# Patient Record
Sex: FEMALE | Race: WHITE | NOT HISPANIC OR LATINO | Employment: OTHER | URBAN - METROPOLITAN AREA
[De-identification: names, ages, dates, MRNs, and addresses within clinical notes are randomized per-mention and may not be internally consistent; named-entity substitution may affect disease eponyms.]

---

## 2024-01-17 ENCOUNTER — APPOINTMENT (OUTPATIENT)
Dept: ORTHOPEDIC SURGERY | Facility: CLINIC | Age: 79
End: 2024-01-17
Payer: MEDICARE

## 2024-02-14 ENCOUNTER — APPOINTMENT (OUTPATIENT)
Dept: ORTHOPEDIC SURGERY | Facility: CLINIC | Age: 79
End: 2024-02-14
Payer: MEDICARE

## 2024-02-21 ENCOUNTER — OFFICE VISIT (OUTPATIENT)
Dept: ORTHOPEDIC SURGERY | Facility: CLINIC | Age: 79
End: 2024-02-21
Payer: MEDICARE

## 2024-02-21 DIAGNOSIS — M67.442 DIGITAL MUCOUS CYST OF FINGER OF LEFT HAND: Primary | ICD-10-CM

## 2024-02-21 PROCEDURE — 1160F RVW MEDS BY RX/DR IN RCRD: CPT | Performed by: ORTHOPAEDIC SURGERY

## 2024-02-21 PROCEDURE — 1036F TOBACCO NON-USER: CPT | Performed by: ORTHOPAEDIC SURGERY

## 2024-02-21 PROCEDURE — 1159F MED LIST DOCD IN RCRD: CPT | Performed by: ORTHOPAEDIC SURGERY

## 2024-02-21 PROCEDURE — 99214 OFFICE O/P EST MOD 30 MIN: CPT | Performed by: ORTHOPAEDIC SURGERY

## 2024-02-21 PROCEDURE — 1125F AMNT PAIN NOTED PAIN PRSNT: CPT | Performed by: ORTHOPAEDIC SURGERY

## 2024-02-21 NOTE — LETTER
March 9, 2024     Eulalio Higuera MD  [address]  [place]  [facility] [address]    Patient: Tabatha Schroeder   YOB: 1945   Date of Visit: 2/21/2024       Dear Dr. Eulalio Higuera MD:    Thank you for referring Tabatha Schroeder to me for evaluation. Below are my notes for this consultation.  If you have questions, please do not hesitate to call me. I look forward to following your patient along with you.       Sincerely,     Jaime Fischer MD      CC: No Recipients  ______________________________________________________________________________________    CHIEF COMPLAINT         Left long cyst    ASSESSMENT + PLAN    Left long finger ulnar mucous cyst    I reviewed the benign nature of the cyst and the relation to the small underlying bone spur from osteoarthritis.  I discussed that simple aspiration leads to inevitable rapid recurrence.  I reviewed the option of surgical excision of the cyst and the spur to minimize the odds of recurrence, though it is still 10%.  I discussed the major risks and benefits of the surgery.    She does want to go ahead with surgery.  This will be done under straight local.  I reviewed that, given her [facility] heritage, it may be difficult to get her adequately comfortable with local alone.  She does want to try anyway.  She will contact the office to set up an exact surgical date.      HISTORY OF PRESENT ILLNESS       Patient returns today, 16 months after last visit with me with a new concern.  Her right long trigger finger has been doing well.  She is here with a mass over her left long finger ulnar DIP dorsum.  This has been present for many months and is slowly enlarging.  It has not drained.  No popping or clicking of the underlying joint.  No similar masses elsewhere.  At this point she wants to proceed with surgery as the mass is bothersome when bumped.      PHYSICAL EXAM       She remains well-developed, well-nourished female in no  acute distress.  She appears her stated age and has a pleasant affect.  Skin of left long finger and hand remains intact with no erythema, ecchymosis, or diffuse swelling.  There is a small mass consistent with a mucous cyst with a palpable underlying bone spur.  No fingernail change.  DIP joint stable to varus and valgus stress.  Good composite finger flexion extension.  Good sagittal plane balance.  Tendons are intact by individual testing.  Sensation intact to light touch in all distributions.  Capillary refill less than 2 seconds.      IMAGING / LABS / EMGs        None today    SURGICAL INDICATION    I reviewed the options for further management of this condition and the likely success rates of each.  The patient feels that they have maximized the benefits of conservative care, and they do want to go on to surgery.    I reviewed the major risks of surgery including infection; scarring; damage to nerves, tendons, fingernail, or vessels; stiffness; failure to relieve mass, recurrent mass, and wound healing problems, as well as anesthesia risks.  I answered their questions to their satisfaction.  They were given my contact information and will contact the office when they are ready to schedule an exact surgical date.  Surgery will be posted as follows :    Dx :          Left long mucous cyst  ICD-10 :      M67.442  Procedure :     Excision of left long ulnar mucous cyst and bone spur  CPT :        51544  Anesth :    Local only  Location :   [facility]  Duration :    45 minutes  Specials :    None  PAT :       No  Post-Op Visit :    10-15 days        Electronically Signed      ERNESTINE Fischer MD      Orthopaedic Hand Surgery      [phone]

## 2024-03-09 NOTE — PROGRESS NOTES
CHIEF COMPLAINT         Left long cyst    ASSESSMENT + PLAN    Left long finger ulnar mucous cyst    I reviewed the benign nature of the cyst and the relation to the small underlying bone spur from osteoarthritis.  I discussed that simple aspiration leads to inevitable rapid recurrence.  I reviewed the option of surgical excision of the cyst and the spur to minimize the odds of recurrence, though it is still 10%.  I discussed the major risks and benefits of the surgery.    She does want to go ahead with surgery.  This will be done under straight local.  I reviewed that, given her [place] heritage, it may be difficult to get her adequately comfortable with local alone.  She does want to try anyway.  She will contact the office to set up an exact surgical date.      HISTORY OF PRESENT ILLNESS       Patient returns today, 16 months after last visit with me with a new concern.  Her right long trigger finger has been doing well.  She is here with a mass over her left long finger ulnar DIP dorsum.  This has been present for many months and is slowly enlarging.  It has not drained.  No popping or clicking of the underlying joint.  No similar masses elsewhere.  At this point she wants to proceed with surgery as the mass is bothersome when bumped.      PHYSICAL EXAM       She remains well-developed, well-nourished female in no acute distress.  She appears her stated age and has a pleasant affect.  Skin of left long finger and hand remains intact with no erythema, ecchymosis, or diffuse swelling.  There is a small mass consistent with a mucous cyst with a palpable underlying bone spur.  No fingernail change.  DIP joint stable to varus and valgus stress.  Good composite finger flexion extension.  Good sagittal plane balance.  Tendons are intact by individual testing.  Sensation intact to light touch in all distributions.  Capillary refill less than 2 seconds.      IMAGING / LABS / EMGs        None today    SURGICAL  INDICATION    I reviewed the options for further management of this condition and the likely success rates of each.  The patient feels that they have maximized the benefits of conservative care, and they do want to go on to surgery.    I reviewed the major risks of surgery including infection; scarring; damage to nerves, tendons, fingernail, or vessels; stiffness; failure to relieve mass, recurrent mass, and wound healing problems, as well as anesthesia risks.  I answered their questions to their satisfaction.  They were given my contact information and will contact the office when they are ready to schedule an exact surgical date.  Surgery will be posted as follows :    Dx :          Left long mucous cyst  ICD-10 :      M67.442  Procedure :     Excision of left long ulnar mucous cyst and bone spur  CPT :        01730  Anesth :    Local only  Location :   [facility]  Duration :    45 minutes  Specials :    None  PAT :       No  Post-Op Visit :    10-15 days        Electronically Signed      ERNESTINE Fischer MD      Orthopaedic Hand Surgery      [phone]

## 2024-06-05 ENCOUNTER — APPOINTMENT (OUTPATIENT)
Dept: ORTHOPEDIC SURGERY | Facility: CLINIC | Age: 79
End: 2024-06-05
Payer: MEDICARE

## 2024-06-12 ENCOUNTER — APPOINTMENT (OUTPATIENT)
Dept: ORTHOPEDIC SURGERY | Facility: CLINIC | Age: 79
End: 2024-06-12
Payer: MEDICARE

## 2024-06-19 ENCOUNTER — APPOINTMENT (OUTPATIENT)
Dept: ORTHOPEDIC SURGERY | Facility: CLINIC | Age: 79
End: 2024-06-19
Payer: MEDICARE

## 2024-07-03 ENCOUNTER — OFFICE VISIT (OUTPATIENT)
Dept: ORTHOPEDIC SURGERY | Facility: CLINIC | Age: 79
End: 2024-07-03
Payer: MEDICARE

## 2024-07-03 DIAGNOSIS — M65.332 TRIGGER FINGER, LEFT MIDDLE FINGER: ICD-10-CM

## 2024-07-03 DIAGNOSIS — M65.331 TRIGGER MIDDLE FINGER OF RIGHT HAND: Primary | ICD-10-CM

## 2024-07-03 PROCEDURE — 99213 OFFICE O/P EST LOW 20 MIN: CPT | Performed by: ORTHOPAEDIC SURGERY

## 2024-07-03 PROCEDURE — 20550 NJX 1 TENDON SHEATH/LIGAMENT: CPT | Mod: LT | Performed by: ORTHOPAEDIC SURGERY

## 2024-07-03 PROCEDURE — 2500000005 HC RX 250 GENERAL PHARMACY W/O HCPCS: Performed by: ORTHOPAEDIC SURGERY

## 2024-07-03 PROCEDURE — 2500000004 HC RX 250 GENERAL PHARMACY W/ HCPCS (ALT 636 FOR OP/ED): Performed by: ORTHOPAEDIC SURGERY

## 2024-07-03 PROCEDURE — 20550 NJX 1 TENDON SHEATH/LIGAMENT: CPT | Mod: RT | Performed by: ORTHOPAEDIC SURGERY

## 2024-07-03 NOTE — LETTER
July 4, 2024     Eulalio Higuera MD  [facility] [address]    Patient: Tabatha Schroeder   YOB: 1945   Date of Visit: 7/3/2024       Dear Dr. Eulalio Higuera MD:    Thank you for referring Tabatha Schroeder to me for evaluation. Below are my notes for this consultation.  If you have questions, please do not hesitate to call me. I look forward to following your patient along with you.       Sincerely,     Jaime Fischer MD      CC: No Recipients  ______________________________________________________________________________________    CHIEF COMPLAINT         Bilateral trigger finger    ASSESSMENT + PLAN    Bilateral long trigger fingers    The nature of trigger finger was reviewed, along with the natural history.  I reviewed the options for management, including observation, nonsteroidals, splinting, oral steroids, cortisone injection, or surgical release, along with the likely success rates of each.  I reviewed the risks of cortisone injection, including infection, hypopigmentation, and fat atrophy.  I cautioned the patient to avoid hot objects where the finger could be burned, if it becomes insensate temporarily from the lidocaine. The transient cortisone effect on blood glucose measurement was also reviewed, and the patient wished to proceed.    After sterile prep, I injected 20 mg of Kenalog and 0.5 cc of 1% lidocaine, plain to the flexor sheath of the each long finger.    Patient will followup in 4 weeks if still symptomatic, or with any concerns.        HISTORY OF PRESENT ILLNESS       Patient returns today, 5 months after last visit with me.  Her left long ulnar mucous cyst has been deflating, leaving just the bone spur visible.  She is pleased with this.  She has developed popping and clicking of both long fingers again.  She had a good response to the previous cortisone injections October 19, 2022.  Symptoms have been back for just a few weeks.  No recalled interval  trauma.  No other bothersome digits.  She is requesting bilateral cortisone injections for these today.      PHYSICAL EXAM       She remains well-developed, well-nourished female in no acute distress.  She appears her stated age and has a pleasant affect.  Skin of both hands and wrists remains intact with no erythema, ecchymosis, or diffuse swelling.  Normal skin drag and coloration.  Full composite finger flexion extension with full intrinsic plus minus posture.  Symmetric wrist and forearm motion.  Palpable flexor nodule and A1 tenderness of both long fingers.  Obvious spontaneous triggering.  Good sagittal plane balance.  Tendons are intact by individual testing.  Negative Hoffmann.  Negative midcarpal shift.  Sensation intact to light touch in all distributions.  Capillary refill less than 2 seconds.    IMAGING / LABS / EMGs        None today      PROCEDURE    Hand / UE Inj/Asp: R long A1 for trigger finger on 7/3/2024 9:25 AM  Indications: therapeutic  Details: 25 G needle, volar approach  Medications: 20 mg triamcinolone acetonide 40 mg/mL; 0.5 mL lidocaine 10 mg/mL (1 %)  Outcome: tolerated well, no immediate complications  Procedure, treatment alternatives, risks and benefits explained, specific risks discussed. Consent was given by the patient.       Hand / UE Inj/Asp: L long A1 for trigger finger on 7/3/2024 9:25 AM  Indications: therapeutic  Details: 25 G needle, volar approach  Medications: 20 mg triamcinolone acetonide 40 mg/mL; 0.5 mL lidocaine 10 mg/mL (1 %)  Outcome: tolerated well, no immediate complications  Procedure, treatment alternatives, risks and benefits explained, specific risks discussed. Consent was given by the patient.             Electronically Signed      ERNESTINE Fischer MD      Orthopaedic Hand Surgery      [phone]

## 2024-07-04 PROBLEM — M65.332 TRIGGER FINGER, LEFT MIDDLE FINGER: Status: ACTIVE | Noted: 2024-07-04

## 2024-07-04 PROBLEM — M65.331 TRIGGER MIDDLE FINGER OF RIGHT HAND: Status: ACTIVE | Noted: 2024-07-04

## 2024-07-04 RX ORDER — TRIAMCINOLONE ACETONIDE 40 MG/ML
20 INJECTION, SUSPENSION INTRA-ARTICULAR; INTRAMUSCULAR
Status: COMPLETED | OUTPATIENT
Start: 2024-07-03 | End: 2024-07-03

## 2024-07-04 RX ORDER — LIDOCAINE HYDROCHLORIDE 10 MG/ML
0.5 INJECTION INFILTRATION; PERINEURAL
Status: COMPLETED | OUTPATIENT
Start: 2024-07-03 | End: 2024-07-03

## 2024-07-04 NOTE — PROGRESS NOTES
CHIEF COMPLAINT         Bilateral trigger finger    ASSESSMENT + PLAN    Bilateral long trigger fingers    The nature of trigger finger was reviewed, along with the natural history.  I reviewed the options for management, including observation, nonsteroidals, splinting, oral steroids, cortisone injection, or surgical release, along with the likely success rates of each.  I reviewed the risks of cortisone injection, including infection, hypopigmentation, and fat atrophy.  I cautioned the patient to avoid hot objects where the finger could be burned, if it becomes insensate temporarily from the lidocaine. The transient cortisone effect on blood glucose measurement was also reviewed, and the patient wished to proceed.    After sterile prep, I injected 20 mg of Kenalog and 0.5 cc of 1% lidocaine, plain to the flexor sheath of the each long finger.    Patient will followup in 4 weeks if still symptomatic, or with any concerns.        HISTORY OF PRESENT ILLNESS       Patient returns today, 5 months after last visit with me.  Her left long ulnar mucous cyst has been deflating, leaving just the bone spur visible.  She is pleased with this.  She has developed popping and clicking of both long fingers again.  She had a good response to the previous cortisone injections October 19, 2022.  Symptoms have been back for just a few weeks.  No recalled interval trauma.  No other bothersome digits.  She is requesting bilateral cortisone injections for these today.      PHYSICAL EXAM       She remains well-developed, well-nourished female in no acute distress.  She appears her stated age and has a pleasant affect.  Skin of both hands and wrists remains intact with no erythema, ecchymosis, or diffuse swelling.  Normal skin drag and coloration.  Full composite finger flexion extension with full intrinsic plus minus posture.  Symmetric wrist and forearm motion.  Palpable flexor nodule and A1 tenderness of both long fingers.  Obvious  spontaneous triggering.  Good sagittal plane balance.  Tendons are intact by individual testing.  Negative Hoffmann.  Negative midcarpal shift.  Sensation intact to light touch in all distributions.  Capillary refill less than 2 seconds.    IMAGING / LABS / EMGs        None today      PROCEDURE    Hand / UE Inj/Asp: R long A1 for trigger finger on 7/3/2024 9:25 AM  Indications: therapeutic  Details: 25 G needle, volar approach  Medications: 20 mg triamcinolone acetonide 40 mg/mL; 0.5 mL lidocaine 10 mg/mL (1 %)  Outcome: tolerated well, no immediate complications  Procedure, treatment alternatives, risks and benefits explained, specific risks discussed. Consent was given by the patient.       Hand / UE Inj/Asp: L long A1 for trigger finger on 7/3/2024 9:25 AM  Indications: therapeutic  Details: 25 G needle, volar approach  Medications: 20 mg triamcinolone acetonide 40 mg/mL; 0.5 mL lidocaine 10 mg/mL (1 %)  Outcome: tolerated well, no immediate complications  Procedure, treatment alternatives, risks and benefits explained, specific risks discussed. Consent was given by the patient.             Electronically Signed      ERNESTINE Fischer MD      Orthopaedic Hand Surgery      [phone]

## 2024-07-25 DIAGNOSIS — M65.332 TRIGGER FINGER, LEFT MIDDLE FINGER: ICD-10-CM

## 2024-07-25 DIAGNOSIS — M65.331 TRIGGER MIDDLE FINGER OF RIGHT HAND: ICD-10-CM

## 2024-08-23 DIAGNOSIS — M65.332 TRIGGER FINGER, LEFT MIDDLE FINGER: ICD-10-CM

## 2024-08-23 DIAGNOSIS — M65.331 TRIGGER MIDDLE FINGER OF RIGHT HAND: ICD-10-CM

## 2024-10-16 ENCOUNTER — APPOINTMENT (OUTPATIENT)
Dept: ORTHOPEDIC SURGERY | Facility: CLINIC | Age: 79
End: 2024-10-16
Payer: MEDICARE

## 2024-10-16 DIAGNOSIS — M79.89 PAIN AND SWELLING OF FOREARM, LEFT: Primary | ICD-10-CM

## 2024-10-16 DIAGNOSIS — M79.632 PAIN AND SWELLING OF FOREARM, LEFT: Primary | ICD-10-CM

## 2024-10-16 PROCEDURE — 99213 OFFICE O/P EST LOW 20 MIN: CPT | Performed by: ORTHOPAEDIC SURGERY

## 2024-10-16 PROCEDURE — 1159F MED LIST DOCD IN RCRD: CPT | Performed by: ORTHOPAEDIC SURGERY

## 2024-10-16 PROCEDURE — 1160F RVW MEDS BY RX/DR IN RCRD: CPT | Performed by: ORTHOPAEDIC SURGERY

## 2024-10-16 PROCEDURE — 1036F TOBACCO NON-USER: CPT | Performed by: ORTHOPAEDIC SURGERY

## 2025-01-08 ENCOUNTER — APPOINTMENT (OUTPATIENT)
Dept: RHEUMATOLOGY | Facility: CLINIC | Age: 80
End: 2025-01-08
Payer: MEDICARE

## 2025-01-08 VITALS
TEMPERATURE: 96.4 F | HEIGHT: 60 IN | DIASTOLIC BLOOD PRESSURE: 58 MMHG | HEART RATE: 74 BPM | WEIGHT: 135 LBS | SYSTOLIC BLOOD PRESSURE: 139 MMHG | BODY MASS INDEX: 26.5 KG/M2

## 2025-01-08 DIAGNOSIS — M47.816 SPONDYLOSIS OF LUMBAR REGION WITHOUT MYELOPATHY OR RADICULOPATHY: ICD-10-CM

## 2025-01-08 DIAGNOSIS — M70.61 TROCHANTERIC BURSITIS OF RIGHT HIP: Primary | ICD-10-CM

## 2025-01-08 PROCEDURE — 1036F TOBACCO NON-USER: CPT | Performed by: INTERNAL MEDICINE

## 2025-01-08 PROCEDURE — 1160F RVW MEDS BY RX/DR IN RCRD: CPT | Performed by: INTERNAL MEDICINE

## 2025-01-08 PROCEDURE — 1159F MED LIST DOCD IN RCRD: CPT | Performed by: INTERNAL MEDICINE

## 2025-01-08 PROCEDURE — 99214 OFFICE O/P EST MOD 30 MIN: CPT | Performed by: INTERNAL MEDICINE

## 2025-01-08 PROCEDURE — 1125F AMNT PAIN NOTED PAIN PRSNT: CPT | Performed by: INTERNAL MEDICINE

## 2025-01-08 RX ORDER — GLUCOSAMINE/CHONDR SU A SOD 750-600 MG
TABLET ORAL
COMMUNITY

## 2025-01-08 RX ORDER — DOCUSATE SODIUM 100 MG/1
100 CAPSULE, LIQUID FILLED ORAL 3 TIMES DAILY PRN
COMMUNITY

## 2025-01-08 RX ORDER — CLONAZEPAM 0.5 MG/1
0.5 TABLET ORAL AS NEEDED
COMMUNITY

## 2025-01-08 RX ORDER — HYDROCHLOROTHIAZIDE 25 MG/1
25 TABLET ORAL DAILY
COMMUNITY

## 2025-01-08 RX ORDER — SITAGLIPTIN AND METFORMIN HYDROCHLORIDE 1000; 50 MG/1; MG/1
1 TABLET, FILM COATED ORAL
COMMUNITY

## 2025-01-08 RX ORDER — ACARBOSE 25 MG/1
25 TABLET ORAL
COMMUNITY

## 2025-01-08 RX ORDER — ASPIRIN 81 MG/1
81 TABLET ORAL DAILY
COMMUNITY

## 2025-01-08 RX ORDER — MAGNESIUM 250 MG
250 TABLET ORAL 2 TIMES DAILY
COMMUNITY

## 2025-01-08 RX ORDER — ACETAMINOPHEN 500 MG
2000 TABLET ORAL DAILY
COMMUNITY

## 2025-01-08 RX ORDER — LIOTHYRONINE SODIUM 5 UG/1
5 TABLET ORAL DAILY
COMMUNITY

## 2025-01-08 RX ORDER — SIMVASTATIN 40 MG/1
40 TABLET, FILM COATED ORAL DAILY
COMMUNITY

## 2025-01-08 RX ORDER — ALLOPURINOL 100 MG/1
100 TABLET ORAL DAILY
COMMUNITY

## 2025-01-08 ASSESSMENT — PAIN SCALES - GENERAL: PAINLEVEL_OUTOF10: 4

## 2025-01-10 NOTE — PROGRESS NOTES
She is a 79-year-old female with history of rheumatoid arthritis, Sjogren syndrome, seizure disorder status post gamma knife resection of benign brain tumor, type 2 diabetes mellitus, lipodystrophicus.  She was last seen in the rheumatology department 3/15/2023.  She presents with complaints of pain in the right lower back and the lateral aspect of the right hip.  She has had improvement in the lower back pain following local glucocorticosteroid injections in the past.  She is planning to wean off of the seizure medications because she has been seizure-free for the past 2 years.  She is generally only had focal seizure but tremor in the right hand.  She has multiple subcutaneous nodules particular on the forearm abdomen and lower extremities due to lipodystrophy.  She continues to be able to ambulate and remains active.  PH: Glutaraldehyde (shortness of breath), [place] (anaphylaxis, doxycycline (rash), hexachlorophene, iodine, clindamycin, codeine (anaphylaxis), atorvastatin, cetirizine, gemfibrozil, opioids diatrizoate meglumine (anaphylaxis); illnesses: Focal seizure status post resection of meningioma from the falx, rheumatoid arthritis, Sjogren syndrome, carpal tunnel syndrome, right cubital tunnel syndrome, type 2 diabetes mellitus, hypertension, hypothyroidism, chronic lower back pain, sacroiliac joint instability, recurrent right greater trochanteric bursitis, meningioma involving the falx resected with gamma knife complicated by cerebral edema and focal seizures in the right upper extremity, gout, multiple lipoid subcutaneous nodules, lipodystrophy; surgeries: Resection of a mucoid cyst from the fifth digit of the right hand, right rotator cuff repair, total abdominal hysterectomy with bilateral salpingo-oophorectomy, gamma knife resection of benign meningioma from the falx (2018), resection of a benign right ethmoid tumor.  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  She  does not use tobacco alcohol or illicit drugs.  FH: Her father is  and had hypertension diabetes mellitus and heart disease.  Her mother had multiple myeloma and hypertension.  She has a daughter with hypertension.  She has an uncle who  at 38 years of age with myocardial infarction.  She has no siblings and no sons.  PX: She is a well-developed, well-nourished, overweight white female with palpable subcutaneous nodules on the proximal aspect of the left forearm.  She is able to ambulate without assistance.  There is tenderness in the right lower back and posterior superior aspect of the right greater trochanter.  There is thickening of the tendons on the palm of both hands with subcutaneous nodules.  There is preserved extension of the digits of both hands.  There is thenar and hypothenar muscle atrophy of the right hand.  There is good passive range of motion of the wrist elbows shoulders hips and knees.  Laboratory (10/11/2024) WBC 7.25, hemoglobin 13.5, hematocrit 40.6, MCV 91.0, MCHC 33.3, platelets 187, BUN 16, creatinine 0.59, glucose 132, alkaline phosphatase 26, AST 20, ALT 18, calcium 10.0, albumin 4.5.  Ultrasound of the left upper extremity (10/11/2024) no evidence of deep vein thrombosis.  Impression: Hypoalkaline phosphatase GERD, right renal calculus, small hiatus hernia, rheumatoid arthritis, Sjogren syndrome, type 2 diabetes mellitus, Dupuytren's involvement of the hands, hypothyroidism, chronic lower back pain, right greater trochanteric bursitis, focal seizures in the right upper extremity status post resection of meningioma from the falx, status post transsphenoidal resection of benign brain tumor.  Plan: She was given a local injection of triamcinolone 20 mg with 1 mL of 1% lidocaine to the region of tenderness in the right lower back and to the tender area of the right greater trochanteric bursa.  She is to return at the next available office appointment.

## 2025-01-15 ENCOUNTER — TELEPHONE (OUTPATIENT)
Dept: ORTHOPEDIC SURGERY | Facility: HOSPITAL | Age: 80
End: 2025-01-15
Payer: MEDICARE

## 2025-01-15 NOTE — TELEPHONE ENCOUNTER
Copied from CRM #[id number]. Topic: Information Request - Get Appointment Information  >> Jan 14, 2025  4:27 PM Romina MONTGOMERY wrote:  Patient is looking to get a next day appointment in [place] after a fall. She has pain in both wrists. Patient did not want to schedule, she would like to speak to the office. Please call to assist.

## 2025-01-22 ENCOUNTER — TELEPHONE (OUTPATIENT)
Dept: RHEUMATOLOGY | Facility: CLINIC | Age: 80
End: 2025-01-22
Payer: MEDICARE

## 2025-01-22 NOTE — TELEPHONE ENCOUNTER
Pt would like to speak about progress notes as she says the information included is incorrect: meds, seizures, and extremities. Pt says other doctors are concerned about terminology. Please advise.    Ph:[phone]- call after 10am

## 2025-01-22 NOTE — TELEPHONE ENCOUNTER
"Placed call to and spoke with patient. Patient voiced concerned over some of the terminology used in the progress note written on 1/8/2025 by Dr. Elmore. Patient states that she is still on her seizure medications and the medication is not being weaned. She states that she has not been seizure free for 2 years. She states that she has had \"focal seizures limited to right hand\" and not her whole right upper extremity. She states that she did not have a resection of meningioma, it was performed by gamma knife. She states the resection of the \"benign mass\" was not performed transsphenoidal, it was done \"transnasal.\" Patient states that she is concerned if one of her other providers read the note they will think that her condition has changed and that she is \"having seizures up to my shoulder\" and not keeping the providers updated on her condition. Patient made aware that Dr. Elmore will be notified of her concerns. Patient verbalized understanding. Patient encouraged to contact the office with any other questions or concerns.   "

## 2025-02-03 ENCOUNTER — OFFICE VISIT (OUTPATIENT)
Dept: ORTHOPEDIC SURGERY | Facility: CLINIC | Age: 80
End: 2025-02-03
Payer: MEDICARE

## 2025-02-03 ENCOUNTER — HOSPITAL ENCOUNTER (OUTPATIENT)
Dept: RADIOLOGY | Facility: CLINIC | Age: 80
Discharge: HOME | End: 2025-02-03
Payer: MEDICARE

## 2025-02-03 VITALS — WEIGHT: 135 LBS | BODY MASS INDEX: 26.5 KG/M2 | HEIGHT: 60 IN

## 2025-02-03 DIAGNOSIS — M25.532 BILATERAL WRIST PAIN: ICD-10-CM

## 2025-02-03 DIAGNOSIS — M79.642 BILATERAL HAND PAIN: ICD-10-CM

## 2025-02-03 DIAGNOSIS — S60.229A CONTUSION OF HAND, INITIAL ENCOUNTER: ICD-10-CM

## 2025-02-03 DIAGNOSIS — M79.641 BILATERAL HAND PAIN: ICD-10-CM

## 2025-02-03 DIAGNOSIS — M25.531 BILATERAL WRIST PAIN: ICD-10-CM

## 2025-02-03 PROCEDURE — 1160F RVW MEDS BY RX/DR IN RCRD: CPT

## 2025-02-03 PROCEDURE — 1159F MED LIST DOCD IN RCRD: CPT

## 2025-02-03 PROCEDURE — 1036F TOBACCO NON-USER: CPT

## 2025-02-03 PROCEDURE — 73110 X-RAY EXAM OF WRIST: CPT | Mod: BILATERAL PROCEDURE | Performed by: RADIOLOGY

## 2025-02-03 PROCEDURE — 99203 OFFICE O/P NEW LOW 30 MIN: CPT

## 2025-02-03 PROCEDURE — 73130 X-RAY EXAM OF HAND: CPT | Mod: 50

## 2025-02-03 PROCEDURE — 73110 X-RAY EXAM OF WRIST: CPT | Mod: 50

## 2025-02-03 PROCEDURE — 73130 X-RAY EXAM OF HAND: CPT | Mod: BILATERAL PROCEDURE | Performed by: RADIOLOGY

## 2025-02-03 PROCEDURE — 99213 OFFICE O/P EST LOW 20 MIN: CPT

## 2025-02-03 NOTE — PROGRESS NOTES
Subjective    Patient ID: Tabatha Schroeder is a 79 y.o. female.    Chief Complaint: Pain of the Right Hand (Tripped over a boot at home striking the wall with both hands), Pain of the Left Hand, Pain of the Right Wrist, and Pain of the Left Wrist    HPI  This is a pleasant ambidextrous 79-year-old female presenting to the same-day injury clinic for evaluation of bilateral hand and wrist pain, which has been ongoing since January 14, 2025.  Patient states that she tripped over a boot in her home, and caught herself with bilateral outstretched hands and wrist.  She immediately experienced increased pain in both hands and wrist, but over the last few weeks, pain has not subsided.  States that pain is worse to the right hand and wrist.  She points to the ulnar aspect of right hand, right fourth and fifth digits when describing the pain.  She has not noticed any significant swelling or bruising.  Patient does have a significant history in regards to bilateral hands, and sees Dr. Jaime Fischer.  Patient states that he was too difficult to get into, therefore she presided to the injury clinic.  Patient has a significant history in regards to both hands, stating history of carpal tunnel, Dupuytren's, de Quervain's, rheumatoid arthritis.  Patient states at baseline she has decreased  strength bilaterally.  She also has some degree of pain and neuropathy in both hands at baseline.  Presents to the office today inquiring whether or not she has broken a bone.    The patient's past medical, surgical, family, and social history as well as allergies and medications were reviewed and updated in the chart.    Objective   Ortho Exam  Pleasant in no acute distress.  Bilateral hands and wrist appearing without soft tissue swelling erythema or ecchymosis.  There is no significant warmth upon touch and skin is intact.  She is diffusely tender upon right distal radius, as well as proximal phalanx of right fourth and fifth digits.   There are no specific point tenderness areas to left hand and wrist.  Exam is difficult as patient has some degree of pain and inflammation at baseline in both hands.  Keeping that in mind, she has slightly limited range of motion of both right and left wrist in terms of flexion and extension.  There is decreased  strength of the right and left hand.  She is able to fully flex and extend all right and left hand digits.  Sensation is intact to light touch.    Image Results:  Multiple view x-rays of the right hand and wrist as well as left hand and wrist obtained today personally reviewed.  There is no evidence of acute fracture or dislocation.  Degenerative changes noted throughout each hand.  No acute bony abnormality noted.    Assessment/Plan   Encounter Diagnoses:  Bilateral hand pain    Bilateral wrist pain    Contusion of hand, initial encounter    Plan: Discussion with patient in regards to contusion of bilateral hands and wrist, which is most likely increased pain in regards to pre-existing bilateral hand and wrist conditions.  Explained to patient that these type of injuries can take anywhere from 4 to 6 weeks to fully heal.  Advised patient that she can continue with anti-inflammatories and avoiding aggravating activities.  She can apply heat or ice.  Patient states that she has a multitude of hand and wrist braces at home that she can use, specifically for the right hand.  I have advised that patient continue with her follow-up that she has scheduled with Dr. Fischer on February 7, 2025.  She can follow-up as needed.    Orders Placed This Encounter    XR hand 3+ views bilateral    XR wrist 3+ views bilateral     No follow-ups on file.

## 2025-02-07 ENCOUNTER — APPOINTMENT (OUTPATIENT)
Dept: ORTHOPEDIC SURGERY | Facility: HOSPITAL | Age: 80
End: 2025-02-07
Payer: MEDICARE

## 2025-02-12 ENCOUNTER — APPOINTMENT (OUTPATIENT)
Dept: ORTHOPEDIC SURGERY | Facility: CLINIC | Age: 80
End: 2025-02-12
Payer: MEDICARE

## 2025-02-12 ENCOUNTER — OFFICE VISIT (OUTPATIENT)
Dept: ORTHOPEDIC SURGERY | Facility: CLINIC | Age: 80
End: 2025-02-12
Payer: MEDICARE

## 2025-02-12 DIAGNOSIS — M79.642 BILATERAL HAND PAIN: Primary | ICD-10-CM

## 2025-02-12 DIAGNOSIS — M79.641 BILATERAL HAND PAIN: Primary | ICD-10-CM

## 2025-02-12 PROCEDURE — 99213 OFFICE O/P EST LOW 20 MIN: CPT | Performed by: ORTHOPAEDIC SURGERY

## 2025-02-12 PROCEDURE — 1160F RVW MEDS BY RX/DR IN RCRD: CPT | Performed by: ORTHOPAEDIC SURGERY

## 2025-02-12 PROCEDURE — 1159F MED LIST DOCD IN RCRD: CPT | Performed by: ORTHOPAEDIC SURGERY

## 2025-02-12 PROCEDURE — 1036F TOBACCO NON-USER: CPT | Performed by: ORTHOPAEDIC SURGERY

## 2025-02-12 NOTE — PROGRESS NOTES
CHIEF COMPLAINT         Left forearm pain    ASSESSMENT + PLAN     ***        HISTORY OF PRESENT ILLNESS       Patient returns today, ***      PHYSICAL EXAM       ***      IMAGING / LABS / EMGs      ***      Electronically Signed      ERNESTINE Fischer MD      Orthopaedic Hand Surgery      [phone]

## 2025-02-12 NOTE — Clinical Note
February 13, 2025     Eulalio Higuera MD  [address]  [place]  [facility] [address]    Patient: Tabatha Schroeder   YOB: 1945   Date of Visit: 2/12/2025       Dear Dr. Eulalio Higuera:    Thank you for referring Tabatha Schroeder to me for evaluation. Below are my notes for this consultation.  If you have questions, please do not hesitate to call me. I look forward to following your patient along with you.       Sincerely,     Jaime Fischer MD      CC: No Recipients  ______________________________________________________________________________________

## 2025-04-01 DIAGNOSIS — M65.331 TRIGGER MIDDLE FINGER OF RIGHT HAND: ICD-10-CM

## 2025-04-01 DIAGNOSIS — M65.332 TRIGGER FINGER, LEFT MIDDLE FINGER: ICD-10-CM

## 2025-07-06 ENCOUNTER — TELEPHONE (OUTPATIENT)
Dept: ORTHOPEDIC SURGERY | Facility: HOSPITAL | Age: 80
End: 2025-07-06
Payer: MEDICARE

## 2025-07-06 NOTE — TELEPHONE ENCOUNTER
Copied from CRM #[id number]. Topic: Outbound call - Decline to Sched  >> Jul 6, 2025 12:44 PM Rashida SONI wrote:  Patient requested call transferred to department. Patient called during the off hours requesting appt with Dr Fischer for injection right hand middle finger, in lots of pain, states Dr will squeeze her in, needs for coming Wednesday at [place] only. Please advise, contact patient [phone].

## 2025-07-10 ENCOUNTER — OFFICE VISIT (OUTPATIENT)
Dept: ORTHOPEDIC SURGERY | Facility: CLINIC | Age: 80
End: 2025-07-10
Payer: MEDICARE

## 2025-07-10 DIAGNOSIS — M65.331 TRIGGER MIDDLE FINGER OF RIGHT HAND: Primary | ICD-10-CM

## 2025-07-10 RX ADMIN — LIDOCAINE HYDROCHLORIDE 0.5 ML: 10 INJECTION, SOLUTION INFILTRATION; PERINEURAL at 12:32

## 2025-07-10 RX ADMIN — TRIAMCINOLONE ACETONIDE 20 MG: 40 INJECTION, SUSPENSION INTRA-ARTICULAR; INTRAMUSCULAR at 12:32

## 2025-07-10 NOTE — PROGRESS NOTES
CHIEF COMPLAINT         Right long finger pain    ASSESSMENT + PLAN    Right long trigger finger, recurrent after remote injection    The nature of trigger finger was reviewed, along with the natural history.  I reviewed the options for management, including observation, nonsteroidals, splinting, oral steroids, cortisone injection, or surgical release, along with the likely success rates of each.  I reviewed the risks of cortisone injection, including infection, hypopigmentation, and fat atrophy.  I cautioned the patient to avoid hot objects where the finger could be burned, if it becomes insensate temporarily from the lidocaine. The transient cortisone effect on blood glucose measurement was also reviewed, and the patient wished to proceed.    After sterile prep, I injected 20 mg of Kenalog and 0.5 cc of 1% lidocaine, plain to the flexor sheath of the right long finger.    Patient will followup in 4 weeks if still symptomatic, or with any concerns.        HISTORY OF PRESENT ILLNESS       Patient returns today, 4 months after last visit with me.  Her forearm pain is at baseline.  She is here with concern of recurrence of her right long trigger finger.  Both long fingers were injected on July 3 of last year.  The left side remains asymptomatic but the right side has been bothersome for couple of weeks.  No interval trauma.  No numbness or tingling.  No other bothersome digits.  She is requesting another injection today.      PHYSICAL EXAM       She remains well-developed, well-nourished female in no acute distress.  She appears her stated age and has a pleasant affect.  Skin of right long finger and hand is intact with no erythema, ecchymosis, or diffuse swelling.  Normal skin drag and coloration.  Full composite finger flexion extension but obvious spontaneous triggering of right long.  Palpable flexor nodule and A1 tenderness only of that digit.  Sensation intact to light touch in all distributions.  Capillary  Patient states light headed and dizzy when standing. During last visit, was to get back in touch regarding medication he is currently taking that could be causing the sx. Advised patient per Dr Louanna Saint, take tamsulosin every other day and monitor sx, report back to office if sx persist. Patient did not start medication change. He will start this week and notify office in couple weeks if sx continue. refill less than 2 seconds.  Negative Hoffmann.  Negative midcarpal shift.      IMAGING / LABS / EMGs    None today      PROCEDURE    Hand / UE Inj/Asp: R long A1 for trigger finger on 7/10/2025 12:32 PM  Indications: therapeutic  Details: 25 G needle, volar approach  Medications: 20 mg triamcinolone acetonide 40 mg/mL; 0.5 mL lidocaine 10 mg/mL (1 %)  Outcome: tolerated well, no immediate complications  Procedure, treatment alternatives, risks and benefits explained, specific risks discussed. Consent was given by the patient.             Electronically Signed      ERNESTINE Fischer MD      Orthopa[facility] Hand Surgery      [phone]

## 2025-07-10 NOTE — LETTER
July 12, 2025     Eulalio Higuera MD  [address]  [place]  [facility] [address]    Patient: Tabatha Schroeder   YOB: 1945   Date of Visit: 7/10/2025       Dear Dr. Eulalio Higuera:    Thank you for referring Tabatha Schroeder to me for evaluation. Below are my notes for this consultation.  If you have questions, please do not hesitate to call me. I look forward to following your patient along with you.       Sincerely,     Jaime Fischer MD      CC: No Recipients  ______________________________________________________________________________________    CHIEF COMPLAINT         Right long finger pain    ASSESSMENT + PLAN    Right long trigger finger, recurrent after remote injection    The nature of trigger finger was reviewed, along with the natural history.  I reviewed the options for management, including observation, nonsteroidals, splinting, oral steroids, cortisone injection, or surgical release, along with the likely success rates of each.  I reviewed the risks of cortisone injection, including infection, hypopigmentation, and fat atrophy.  I cautioned the patient to avoid hot objects where the finger could be burned, if it becomes insensate temporarily from the lidocaine. The transient cortisone effect on blood glucose measurement was also reviewed, and the patient wished to proceed.    After sterile prep, I injected 20 mg of Kenalog and 0.5 cc of 1% lidocaine, plain to the flexor sheath of the right long finger.    Patient will followup in 4 weeks if still symptomatic, or with any concerns.        HISTORY OF PRESENT ILLNESS       Patient returns today, 4 months after last visit with me.  Her forearm pain is at baseline.  She is here with concern of recurrence of her right long trigger finger.  Both long fingers were injected on July 3 of last year.  The left side remains asymptomatic but the right side has been bothersome for couple of weeks.  No interval trauma.  No  numbness or tingling.  No other bothersome digits.  She is requesting another injection today.      PHYSICAL EXAM       She remains well-developed, well-nourished female in no acute distress.  She appears her stated age and has a pleasant affect.  Skin of right long finger and hand is intact with no erythema, ecchymosis, or diffuse swelling.  Normal skin drag and coloration.  Full composite finger flexion extension but obvious spontaneous triggering of right long.  Palpable flexor nodule and A1 tenderness only of that digit.  Sensation intact to light touch in all distributions.  Capillary refill less than 2 seconds.  Negative Hoffmann.  Negative midcarpal shift.      IMAGING / LABS / EMGs    None today      PROCEDURE    Hand / UE Inj/Asp: R long A1 for trigger finger on 7/10/2025 12:32 PM  Indications: therapeutic  Details: 25 G needle, volar approach  Medications: 20 mg triamcinolone acetonide 40 mg/mL; 0.5 mL lidocaine 10 mg/mL (1 %)  Outcome: tolerated well, no immediate complications  Procedure, treatment alternatives, risks and benefits explained, specific risks discussed. Consent was given by the patient.             Electronically Signed      ERNESTINE Fischer MD      Orthopaedic Hand Surgery      [phone]

## 2025-07-12 RX ORDER — LIDOCAINE HYDROCHLORIDE 10 MG/ML
0.5 INJECTION, SOLUTION INFILTRATION; PERINEURAL
Status: COMPLETED | OUTPATIENT
Start: 2025-07-10 | End: 2025-07-10

## 2025-07-12 RX ORDER — TRIAMCINOLONE ACETONIDE 40 MG/ML
20 INJECTION, SUSPENSION INTRA-ARTICULAR; INTRAMUSCULAR
Status: COMPLETED | OUTPATIENT
Start: 2025-07-10 | End: 2025-07-10